# Patient Record
Sex: FEMALE | Race: WHITE
[De-identification: names, ages, dates, MRNs, and addresses within clinical notes are randomized per-mention and may not be internally consistent; named-entity substitution may affect disease eponyms.]

---

## 2018-01-06 ENCOUNTER — HOSPITAL ENCOUNTER (EMERGENCY)
Dept: HOSPITAL 80 - FED | Age: 43
Discharge: HOME | End: 2018-01-06
Payer: COMMERCIAL

## 2018-01-06 VITALS
OXYGEN SATURATION: 99 % | TEMPERATURE: 97.5 F | RESPIRATION RATE: 14 BRPM | DIASTOLIC BLOOD PRESSURE: 70 MMHG | SYSTOLIC BLOOD PRESSURE: 122 MMHG | HEART RATE: 70 BPM
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
## 2018-01-06 DIAGNOSIS — S62.522B: Primary | ICD-10-CM

## 2018-01-06 PROCEDURE — 0HQQXZZ REPAIR FINGER NAIL, EXTERNAL APPROACH: ICD-10-PCS

## 2018-01-06 NOTE — EDPHY
H & P


Stated Complaint: Lac to L thumb on utility knife


Time Seen by Provider: 01/06/18 18:46


HPI/ROS: 





HPI:  This is a 42-year-old female who presents with





Chief Complaint: Lac to L thumb on utility knife





Location:  Left thumb


Quality:  Laceration


Duration:  Prior to arrival


Signs and Symptoms:  + bleeding, no radiation, no numbness, no weakness, no 

tingling, no incontinence, no decreased range of motion, no swelling, + pain


Timing:  Acute


Severity:  Mild-to-moderate


Context:  Patient is right-hand dominant, was using [employer] knife, when she 

accidentally slipped and cut the distal portion of her left thumb going through 

her the distal end of her nail partially.  He reports he immediate moderate 

pain that was nonradiating in nature accompanied by bleeding.  She applied 

direct pressure and now her right hand is stuck to her left hand due to the 

blood trying.  Patient is very reluctant for me to pull with the dressing or 

moved from a fetal position.  She denies paresthesias/skin color changes/

decreased range of motion. Tetanus up-to-date.


Modifying Factors:  Direct pressure





Comment: 








ROS: see HPI


Constitutional: No fever, no chills, no weight loss


Eyes:  No blurred vision


Respiratory:  No shortness of breath, no cough


Cardiovascular:  No chest pain


Gastrointestinal:  No nausea, no vomiting no diarrhea 


Genitourinary:  No dysuria 


Extremities:  No myalgias 


Neurologic:  No weakness, no numbness


Skin:  No rashes


Hematologic:  No bruising, no bleeding





MEDICAL/SURGICAL/SOCIAL HISTORY: 


Medical history:  Vasovagal syndrome


Surgical history:  Appendectomy


Social history:  .  Employed.











CONSTITUTIONAL:  Extremely anxious adult white female, awake and alert, no 

obvious distress


HEENT: Atraumatic and normocephalic, PERRL, EOMI. Tympanic membranes clear. 

Oropharynx clear, no exudate and moist pink mucosa.  Airway patent.  No 

lymphadenopathy.  No meningismus.


Cardiovascular: Normal S1/S2, regular rate, regular rhythm, without murmur rub 

or gallop.


PULMONARY/CHEST:  Symmetrical and nontender. Clear to auscultation bilaterally. 

Good air movement. No accessory muscle usage.


ABDOMEN:  Soft, nondistended, nontender, no rebound, no guarding, no peritoneal 

signs, no masses or organomegaly. No CVAT.


EXTREMITIES:  2/2 radial pulses, strength 5/5, left thumb distal portion 

lateral aspect 2.5 cm linear, simple, laceration from the outer lateral edge 

into the nail extending approximately shelter.  Light touch sensation intact. 

DIP/PIP/MCP flexion, extension intact. no deformities, no clubbing, no cyanosis 

or edema.


NEUROLOGICAL: no focal neuro deficits.  GCS 15.


SKIN: Warm and dry, no erythema. no rash.  Good capillary refill. 


  


Source: Patient, Family ()


Exam Limitations: No limitations





- Personal History


LMP (Females 10-55): 8-14 Days Ago


Current Tetanus Diphtheria and Acellular Pertussis (TDAP): Yes


Tetanus Vaccine Date: WITHIN 10 YRS





- Medical/Surgical History


Other PMH: APPY.  Vaso-vagal syndrom





- Social History


Smoking Status: Never smoked


Constitutional: 


 Initial Vital Signs











Temperature (C)  36.7 C   01/06/18 18:36


 


Heart Rate  71   01/06/18 18:36


 


Respiratory Rate  16   01/06/18 18:36


 


Blood Pressure  85/48 L  01/06/18 18:36


 


O2 Sat (%)  97   01/06/18 18:36








 











O2 Delivery Mode               Room Air














Allergies/Adverse Reactions: 


 





Penicillins Allergy (Intermediate, Verified 01/06/18 18:35)


 Hives








Home Medications: 














 Medication  Instructions  Recorded


 


Zoloft 100mg (RX)  08/24/15


 


Cephalexin [Keflex (*)] 500 mg PO QID #28 cap 01/06/18


 


oxyCODONE/APAP 5/325 [Percocet 1 - 2 tab PO Q4H PRN #12 tab 01/06/18





5/325 (*)]  














Medical Decision Making





- Diagnostics


Imaging Results: 


 Imaging Impressions





Finger X-Ray  01/06/18 19:07


Impression: Minimally displaced fracture at the tip of the terminal tuft left 

thumb at the site of soft tissue injury.











Procedures: 


Procedure:  Laceration repair.





Verbal consent was obtained from the patient.  The 2.5 cm, complex, linear, 

deep laceration on the left lateral thumb distal portion was anesthetized in 

the usual fashion using a digital block of 0.5% bupivacaine approximately 4 mL.

  The wound was irrigated, draped and explored to its base with a gloved 

finger.  There were no deep structures involved.  No tendon injury was 

identified.  No foreign bodies were identified. The wound was repaired with #4, 

5-0 Prolene.  Good hemostasis was achieved and patient tolerated relatively 

well.  The procedure was performed by myself.





Procedure:  Nail bed reconstruction.


Laceration repair:  Verbal consent was obtained from the patient.  A nail bed 

laceration was identified on the left thumb. The finger was anesthetized in the 

usual fashion.  The wound was scrubbed, draped and explored to its base with a 

gloved finger.  The nail plate was repaired using #3, 5-0 chromic gut. There is 

a distal tuft fracture.  The procedure was performed by myself.








Procedure:  Splint placement.





A left thumb finger splint was applied.  After application of the splint I 

returned and re-examined the patient.  The splint was adequately immobilizing 

the joint and distal to the splint the patient's circulation and sensation was 

intact.





ED Course/Re-evaluation: 


Left thumb x-ray ordered to evaluate for foreign body


Wound care and laceration repair ordered


Tetanus up-to-date


No signs of neurovascular compromise/tenting of skin/compartment syndrome/

extremities and joints examined above and below area of concern and are 

neurovascularly intact.


X-ray my read shows a small tuft fracture.


Given Keflex as there is a low risk for open fracture. 


Xeroform, dressing and finger splint applied











This patient was seen under the supervision of my secondary supervising 

physician.  I evaluated care for this patient independently.  Discussed this 

patient with Dr. Kwok who did not see the patient.  Patient's presentation, 

labs/imaging, treatment and plan of care were discussed with secondary 

supervising physician.  





Differential Diagnosis: 


Differential diagnosis includes but is not limited to laceration, nerve injury, 

tendon injury, nail involvement, contusion, hematoma.








- Data Points


Medications Given: 


 








Discontinued Medications





Cephalexin HCl (Keflex)  500 mg PO EDNOW ONE


   PRN Reason: Protocol


   Stop: 01/06/18 20:06


   Last Admin: 01/06/18 20:08 Dose:  500 mg








Departure





- Departure


Disposition: Home, Routine, Self-Care


Clinical Impression: 


 Closed fracture of tuft of distal phalanx of left thumb





Laceration of left thumb with damage to nail


Qualifiers:


 Encounter type: initial encounter Foreign body presence: without foreign body 

Qualified Code(s): S61.112A - Laceration without foreign body of left thumb 

with damage to nail, initial encounter





Condition: Good


Instructions:  Finger Fracture (ED), Finger Laceration (ED), Nail Avulsion (ED)


Additional Instructions: 


Keep the dressing/splint dry and in place x 7 days which seen for follow-up 

with Orthopedics. 


After 7 days you may remove the splint; wash the area with mild soap and water 

and pat dry. 


Please keep cover with clean sterile dressing and use splint until fully 

healed. 


Take Tylenol 650 mg every 4 hours and/or Ibuprofen 600 mg every 8 hours with 

food as needed for pain. 


Apply ice for 30 minutes at a time; 2-3 times per day for the next 1-2 days. 


Sutures will need to be removed in 10 days.  Please return to the emergency 

room to have these removed. 


This sutures in nail are absorbable and will slowly dissolve on their own. 


Your nail will take weeks to months to grow out. 


Take all the antibiotics as indicated until complete. 


Please follow up with Hand surgery in 7-10 days for re-evaluation and to 

determine if conservative versus further given therapy is indicated. 





T


Referrals: 


Bethany Yancey MD [Primary Care Provider] - As per Instructions


Adebayo Enrique MD [Medical Doctor] - As per Instructions


Prescriptions: 


Cephalexin [Keflex (*)] 500 mg PO QID #28 cap


oxyCODONE/APAP 5/325 [Percocet 5/325 (*)] 1 - 2 tab PO Q4H PRN #12 tab


 PRN Reason: Pain, Severe